# Patient Record
Sex: MALE | Race: WHITE | URBAN - METROPOLITAN AREA
[De-identification: names, ages, dates, MRNs, and addresses within clinical notes are randomized per-mention and may not be internally consistent; named-entity substitution may affect disease eponyms.]

---

## 2018-08-24 ENCOUNTER — TRANSFERRED RECORDS (OUTPATIENT)
Dept: HEALTH INFORMATION MANAGEMENT | Facility: CLINIC | Age: 48
End: 2018-08-24

## 2018-08-24 ENCOUNTER — MEDICAL CORRESPONDENCE (OUTPATIENT)
Dept: HEALTH INFORMATION MANAGEMENT | Facility: CLINIC | Age: 48
End: 2018-08-24

## 2018-12-12 NOTE — TELEPHONE ENCOUNTER
FUTURE VISIT INFORMATION      FUTURE VISIT INFORMATION:    Date: 12/24/18    Time: 10a    Location: [facility]  REFERRAL INFORMATION:    Referring provider:  Dr Celso Douglass    Referring providers clinic:  [facility]    Reason for visit/diagnosis  Myotoni    RECORDS REQUESTED FROM:       Clinic name Comments Records Status Imaging Status   [facility] Neurology Dr. Negrete 6/2/14 OV Internal                                12/12/18: External referral from Dr. Douglass at [facility]. Records and referral from [facility] scanned in [facility]. Patient previously seen by Dr. Negrete in 2014.

## 2018-12-24 ENCOUNTER — PRE VISIT (OUTPATIENT)
Dept: NEUROLOGY | Facility: CLINIC | Age: 48
End: 2018-12-24

## 2018-12-24 ENCOUNTER — OFFICE VISIT (OUTPATIENT)
Dept: NEUROLOGY | Facility: CLINIC | Age: 48
End: 2018-12-24
Payer: COMMERCIAL

## 2018-12-24 VITALS — SYSTOLIC BLOOD PRESSURE: 93 MMHG | OXYGEN SATURATION: 99 % | DIASTOLIC BLOOD PRESSURE: 65 MMHG | HEART RATE: 84 BPM

## 2018-12-24 DIAGNOSIS — G71.11 MYOTONIC MUSCULAR DYSTROPHY (H): Primary | ICD-10-CM

## 2018-12-24 PROBLEM — G47.00 INSOMNIA: Status: ACTIVE | Noted: 2018-12-24

## 2018-12-24 RX ORDER — LORATADINE 10 MG/1
10 TABLET ORAL
COMMUNITY
Start: 2018-12-10

## 2018-12-24 RX ORDER — IBUPROFEN 200 MG
200 TABLET ORAL
COMMUNITY
Start: 2018-07-09

## 2018-12-24 RX ORDER — ACETAMINOPHEN 325 MG/1
650 TABLET ORAL
COMMUNITY

## 2018-12-24 RX ORDER — OXYCODONE HYDROCHLORIDE 10 MG/1
5 TABLET ORAL
COMMUNITY

## 2018-12-24 RX ORDER — IPRATROPIUM BROMIDE AND ALBUTEROL SULFATE 2.5; .5 MG/3ML; MG/3ML
3 SOLUTION RESPIRATORY (INHALATION)
COMMUNITY
Start: 2018-12-10

## 2018-12-24 RX ORDER — ONDANSETRON 4 MG/1
4 TABLET, ORALLY DISINTEGRATING ORAL
COMMUNITY
Start: 2018-12-10

## 2018-12-24 RX ORDER — BISACODYL 10 MG
10 SUPPOSITORY, RECTAL RECTAL
COMMUNITY
Start: 2018-12-10

## 2018-12-24 NOTE — NURSING NOTE
Chief Complaint   Patient presents with     New Patient     [facility] NEW MUSCULAR DYSTROPHY     Pt. Does not know which medications he's taking, all liquid form administered by care staff.    Verena Torres, EMT

## 2018-12-24 NOTE — PROGRESS NOTES
Chief Complaint: Myotonic dystrophy type 1    History of Present Illness:    Eulalio Fierro is a 48 year old man with genetically confirmed DM1. He was last seen by Dr. Negrete in 2014. He lives with his family. His history is a bit difficult to put together, specifically the status of his current multidiscplinary care for his DM1. He is not sure if he has a cardiologist or a pulmonologist. I see there is a recent hospital admission note from [facility] for acute hypercapnic and hypoxemic respiratory failure. He was see by pulmonology in the hospital. Bipap was recommended by the patient refused. During that hospitalization he had a g tube placed for dysphagia. He was seen by cardiology. I also do not know what kind of follow up or continuity of care was arranged. He offers very little insight into his medical care.     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    Social History:    He denies tobacco, alcohol, or illicit drug use.    Medical Allergies:  NKDA     Current Medications:    Current Outpatient Medications   Medication     METOPROLOL TARTRATE PO     oxyCODONE-acetaminophen (PERCOCET) 7.5-325 MG per tablet     No current facility-administered medications for this visit.      Review of Systems: A complete review of systems was obtained and was negative except for what was noted above.    Physical examination:     BP 99/68 (BP Location: Right arm, Patient Position: Chair, Cuff Size: Adult Regular)   Pulse 79   SpO2 99%     General Appearance: NAD    Skin: There are no rashes or other skin lesions.    Musculoskeletal:  There is no scoliosis, lordosis, kyphosis, pes cavus, or hammertoes.    Neurologic examination:    Mental status:  Patient is alert, attentive,  and oriented x 3.  Language is coherent and fluent without aphasia.  Very poor historian.     Cranial nerves:  Bifacial weakness present. He is dysarthric. Pupils were round and reacted to light.  Extraocular movements were full.     Motor exam: Formal power testing is limited by effort. Proximal upper limb strength is probably 4/5, while distal is 3/5. Lower limb testing is also limited.     Complex motor skills revealed normal coordination.  Finger-nose- finger and heel to shin were intact.       Sensory exam revealed normal perception of vibration, proprioception, light touch, pin, and temperature proximally and distally in the arms and legs bilaterally. Romberg sign was absent.       Sensory exam: Able to detect pin and vibration in hands and feet    Gait: Not tested.     Deep tendon reflexes:   Right Left   Triceps 1 1   Biceps 1 1   Brachioradialis 1 1   Knee jerk 1 1   Ankle jerk 0 0      Assessment:  Eulalio Fierro is a 48 year old male with genetically confirmed myotonic dystrophy type 1. Myotonic dystrophy (DM) is a genetic disorder that affects CNS, cardiac, respiratory, gastrointestinal, endocrine and muscular systems. DM1 is an autosomal dominant disorder caused by a repeat expansion in the of the dystrophia myotonica-protein kinase (DMPK) gene on chromosome 19q13.3. Disease severity roughly correlates with the number of repeats, with greater numbers of repeats enerally having more severe disease. Pulmonary complications are the leading cause of death in DM1, followed by cardiac causes. His care appears very fractionated. I do not know who has been managing his pulmonary and cardiac conditions. Clearly his pulmonary issues are most pressing. I will try to help him get these coordinated. Will proceed as below.     Plan:  1. Respiratory: Will get him a pulmonology referral. He has refused NIV in the past.   2. Cardiac:  Referral to [facility] cardiology  3. Swallowing and speech: S/p PEG placement. Defer  management to his primary medical providers.   4. Muscular: No indication for Mexiletine or other medication for myotonia at this time  5. Endocrine: Hba1c and TSH   6. Anesthesia: The use of anesthesia raises special risks to DM patients, which include heightened sensitivity to sedatives and analgesics. Serious complications are most common in the post-anesthesia period when risk of aspiration and other complications are increased. Recommendations for anesthetic management can be found here: https://www.myotonic.org/sites/default/files/MDF%20Anesthesia%20Guidelines%20FNL%931646%202%202%20.pdf   Practical suggestions for the anesthetic management of a MD patient can be found here: https://www.myotonic.org/sites/default/files/MDF_LongForm_AnesGuidelines_01C.pdf  7. [facility] registration:  8. Follow up after above  -

## 2018-12-24 NOTE — LETTER
12/24/2018       RE: Eulalio Fierro  [address] [place] [facility] [address]     Dear Colleague,    Thank you for referring your patient, Eulalio Fierro, to the [facility] NEUROLOGY at [facility]. Please see a copy of my visit note below.    Chief Complaint: Myotonic dystrophy type 1    History of Present Illness:    Eulalio Fierro is a 48 year old man with genetically confirmed DM1. He was last seen by Dr. Negrete in 2014. He lives with his family. His history is a bit difficult to put together, specifically the status of his current multidiscplinary care for his DM1. He is not sure if he has a cardiologist or a pulmonologist. I see there is a recent hospital admission note from [facility] for acute hypercapnic and hypoxemic respiratory failure. He was see by pulmonology in the hospital. Bipap was recommended by the patient refused. During that hospitalization he had a g tube placed for dysphagia. He was seen by cardiology. I also do not know what kind of follow up or continuity of care was arranged. He offers very little insight into his medical care.     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    Social History:    He denies tobacco, alcohol, or illicit drug use.    Medical Allergies:  NKDA     Current Medications:    Current Outpatient Medications   Medication     METOPROLOL TARTRATE PO     oxyCODONE-acetaminophen (PERCOCET) 7.5-325 MG per tablet     No current facility-administered medications for this visit.      Review of Systems: A complete review of systems was obtained and was negative except for what was noted above.    Physical examination:     BP 99/68 (BP Location: Right arm, Patient Position: Chair, Cuff Size: Adult Regular)    Pulse 79   SpO2 99%     General Appearance: NAD    Skin: There are no rashes or other skin lesions.    Musculoskeletal:  There is no scoliosis, lordosis, kyphosis, pes cavus, or hammertoes.    Neurologic examination:    Mental status:  Patient is alert, attentive, and oriented x 3.  Language is coherent and fluent without aphasia.  Very poor historian.     Cranial nerves:  Bifacial weakness present. He is dysarthric. Pupils were round and reacted to light.  Extraocular movements were full.     Motor exam: Formal power testing is limited by effort. Proximal upper limb strength is probably 4/5, while distal is 3/5. Lower limb testing is also limited.     Complex motor skills revealed normal coordination.  Finger-nose- finger and heel to shin were intact.       Sensory exam revealed normal perception of vibration, proprioception, light touch, pin, and temperature proximally and distally in the arms and legs bilaterally. Romberg sign was absent.       Sensory exam: Able to detect pin and vibration in hands and feet    Gait: Not tested.     Deep tendon reflexes:   Right Left   Triceps 1 1   Biceps 1 1   Brachioradialis 1 1   Knee jerk 1 1   Ankle jerk 0 0      Assessment:  Eulalio Fierro is a 48 year old male with genetically confirmed myotonic dystrophy type 1. Myotonic dystrophy (DM) is a genetic disorder that affects CNS, cardiac, respiratory, gastrointestinal, endocrine and muscular systems. DM1 is an autosomal dominant disorder caused by a repeat expansion in the of the dystrophia myotonica-protein kinase (DMPK) gene on chromosome 19q13.3. Disease severity roughly correlates with the number of repeats, with greater numbers of repeats enerally having more severe disease. Pulmonary complications are the leading cause of death in DM1, followed by cardiac causes. His care appears very fractionated. I do not know who has been managing his pulmonary and cardiac conditions. Clearly his pulmonary issues are most  pressing. I will try to help him get these coordinated. Will proceed as below.     Plan:  1. Respiratory: Will get him a pulmonology referral. He has refused NIV in the past.   2. Cardiac:  Referral to [facility] cardiology  3. Swallowing and speech: S/p PEG placement. Defer management to his primary medical providers.   4. Muscular: No indication for Mexiletine or other medication for myotonia at this time  5. Endocrine: Hba1c and TSH   6. Anesthesia: The use of anesthesia raises special risks to DM patients, which include heightened sensitivity to sedatives and analgesics. Serious complications are most common in the post-anesthesia period when risk of aspiration and other complications are increased. Recommendations for anesthetic management can be found here: https://www.myotonic.org/sites/default/files/MDF%20Anesthesia%20Guidelines%20FNL%509984%202%202%20.pdf   Practical suggestions for the anesthetic management of a MD patient can be found here: https://www.myotonic.org/sites/default/files/MDF_LongForm_AnesGuidelines_01C.pdf  7. [facility] registration:  8. Follow up after above  -      Jorge Pelaez MD

## 2019-01-16 DIAGNOSIS — G71.11 MYOTONIC MUSCULAR DYSTROPHY (H): ICD-10-CM

## 2019-01-16 DIAGNOSIS — Z13.6 SCREENING FOR CARDIOVASCULAR CONDITION: Primary | ICD-10-CM

## 2019-01-16 DIAGNOSIS — I48.0 PAROXYSMAL ATRIAL FIBRILLATION (H): Primary | ICD-10-CM

## 2019-01-18 DIAGNOSIS — G71.11 MYOTONIC MUSCULAR DYSTROPHY (H): Primary | ICD-10-CM

## 2019-01-21 ENCOUNTER — OFFICE VISIT (OUTPATIENT)
Dept: PULMONOLOGY | Facility: CLINIC | Age: 49
End: 2019-01-21
Payer: COMMERCIAL

## 2019-01-21 ENCOUNTER — ANCILLARY PROCEDURE (OUTPATIENT)
Dept: CARDIOLOGY | Facility: CLINIC | Age: 49
End: 2019-01-21
Attending: INTERNAL MEDICINE
Payer: COMMERCIAL

## 2019-01-21 VITALS
HEART RATE: 103 BPM | SYSTOLIC BLOOD PRESSURE: 103 MMHG | HEIGHT: 70 IN | OXYGEN SATURATION: 94 % | BODY MASS INDEX: 28.69 KG/M2 | WEIGHT: 200.4 LBS | DIASTOLIC BLOOD PRESSURE: 66 MMHG

## 2019-01-21 VITALS
SYSTOLIC BLOOD PRESSURE: 103 MMHG | BODY MASS INDEX: 28.69 KG/M2 | DIASTOLIC BLOOD PRESSURE: 66 MMHG | OXYGEN SATURATION: 94 % | WEIGHT: 200.4 LBS | HEART RATE: 103 BPM | RESPIRATION RATE: 16 BRPM | HEIGHT: 70 IN

## 2019-01-21 DIAGNOSIS — G71.11 MYOTONIC MUSCULAR DYSTROPHY (H): Primary | ICD-10-CM

## 2019-01-21 DIAGNOSIS — I48.0 PAROXYSMAL ATRIAL FIBRILLATION (H): ICD-10-CM

## 2019-01-21 DIAGNOSIS — J98.4 RESTRICTIVE LUNG DISEASE: ICD-10-CM

## 2019-01-21 DIAGNOSIS — Z13.6 ENCOUNTER FOR SCREENING FOR CARDIOVASCULAR DISORDERS: Primary | ICD-10-CM

## 2019-01-21 DIAGNOSIS — G71.11 MYOTONIC DYSTROPHY (H): Primary | ICD-10-CM

## 2019-01-21 DIAGNOSIS — G71.11 MYOTONIC MUSCULAR DYSTROPHY (H): ICD-10-CM

## 2019-01-21 DIAGNOSIS — R06.02 SOB (SHORTNESS OF BREATH): ICD-10-CM

## 2019-01-21 PROBLEM — R13.12 OROPHARYNGEAL DYSPHAGIA: Status: ACTIVE | Noted: 2018-12-05

## 2019-01-21 PROBLEM — J02.9 ACUTE PHARYNGITIS: Status: ACTIVE | Noted: 2018-11-17

## 2019-01-21 PROBLEM — R91.8 PULMONARY NODULES: Status: ACTIVE | Noted: 2018-07-03

## 2019-01-21 PROBLEM — I30.9 ACUTE PERICARDITIS: Status: ACTIVE | Noted: 2018-12-05

## 2019-01-21 PROBLEM — R53.81 PHYSICAL DECONDITIONING: Status: ACTIVE | Noted: 2018-12-10

## 2019-01-21 PROBLEM — J96.01 ACUTE RESPIRATORY FAILURE WITH HYPOXIA AND HYPERCAPNIA (H): Status: ACTIVE | Noted: 2018-11-17

## 2019-01-21 PROBLEM — J96.02 ACUTE RESPIRATORY FAILURE WITH HYPOXIA AND HYPERCAPNIA (H): Status: ACTIVE | Noted: 2018-11-17

## 2019-01-21 LAB
CK SERPL-CCNC: 50 U/L (ref 30–300)
NT-PROBNP SERPL-MCNC: 32 PG/ML (ref 0–125)
TROPONIN I SERPL-MCNC: <0.015 UG/L (ref 0–0.04)

## 2019-01-21 PROCEDURE — 0298T ZZC EXT ECG > 48HR TO 21 DAY REVIEW AND INTERPRETATN: CPT | Mod: ZP | Performed by: INTERNAL MEDICINE

## 2019-01-21 PROCEDURE — 99205 OFFICE O/P NEW HI 60 MIN: CPT | Mod: ZP | Performed by: INTERNAL MEDICINE

## 2019-01-21 PROCEDURE — 82550 ASSAY OF CK (CPK): CPT

## 2019-01-21 PROCEDURE — G0463 HOSPITAL OUTPT CLINIC VISIT: HCPCS | Mod: 25,ZF

## 2019-01-21 PROCEDURE — 83880 ASSAY OF NATRIURETIC PEPTIDE: CPT

## 2019-01-21 PROCEDURE — 93010 ELECTROCARDIOGRAM REPORT: CPT | Mod: ZP | Performed by: INTERNAL MEDICINE

## 2019-01-21 PROCEDURE — 0296T ZIO PATCH HOLTER ADULT PEDIATRIC GREATER THAN 48 HRS: CPT | Mod: ZF

## 2019-01-21 PROCEDURE — 93005 ELECTROCARDIOGRAM TRACING: CPT | Mod: ZF

## 2019-01-21 PROCEDURE — 84484 ASSAY OF TROPONIN QUANT: CPT

## 2019-01-21 RX ORDER — LANOLIN ALCOHOL/MO/W.PET/CERES
3 CREAM (GRAM) TOPICAL
COMMUNITY
Start: 2019-01-07

## 2019-01-21 RX ORDER — PROBIOTIC PRODUCT - TAB 1B-250 MG
2 TAB ORAL
COMMUNITY
Start: 2019-01-07

## 2019-01-21 RX ORDER — QUETIAPINE FUMARATE 25 MG/1
25 TABLET, FILM COATED ORAL
COMMUNITY
Start: 2019-01-07

## 2019-01-21 RX ORDER — TRAZODONE HYDROCHLORIDE 100 MG/1
100 TABLET ORAL
COMMUNITY
Start: 2019-01-07

## 2019-01-21 SDOH — HEALTH STABILITY: MENTAL HEALTH: HOW OFTEN DO YOU HAVE A DRINK CONTAINING ALCOHOL?: NEVER

## 2019-01-21 ASSESSMENT — MIFFLIN-ST. JEOR
SCORE: 1785.25
SCORE: 1785.26

## 2019-01-21 ASSESSMENT — PAIN SCALES - GENERAL
PAINLEVEL: NO PAIN (0)
PAINLEVEL: NO PAIN (0)

## 2019-01-21 NOTE — PROGRESS NOTES
Neurocardiomyopathy Clinic Consultation    Name: Eulalio Fierro  : 1970  MRN: [id number]    2019    Dear Yasmin Pelaez and Penelope,    I had the pleasure of seeing Eulalio Fierro, a 48 year old man today 2019 in the [facility] Failure [facility] for cardiovascular evaluation in the setting of myotonic dystrophy. He is here with an assistant from his nursing home.     As you know, he has genetically confirmed myotonic dystrophy type 1 with 796 CTG repeats of the DMPK gene and .     He was recently hospitalized -12/10 at [facility] for hypercapneic and hypoxic respiratory failure. He was seen by pulmonology during this hospitalization and BiPAP was recommended but the patient refused. A PEG tube was placed during this admission for dysphagia and he now only gets nutrition via G tube. He had an episode of chest pain and was seen by cardiology. An ECG showed ST elevation in all leads, but troponins were negative. He was initially treated for pericarditis and subsequently the ECG changes were attributed to early repolarization. They also suspected that his chest pain may be related to GERD. He had an echocardiogram during this hospitalization  which demonstrated LVEF of 64%, normal RV size and function, and no significant valvular disease.     He is not able to provide a significant amount of information and much of it is from his assistant. He is using a wheelchair when he is out and otherwise a walker to walk short distances in his room. He uses 4L of oxygen and does have some dyspnea with walking short distances. He is not having any further chest pain since using the PEG. He denies palpitations. He does have edema in his legs at the end of the day. No orthopnea or PND.  He has had some dizziness when standing but no pre/syncope.      ROS: 10 point ROS neg other than the symptoms noted above in the HPI.    PAST MEDICAL HISTORY:  1.      "Myononic dystrophye type 1 796 CTG repeats on the DMPK gene  2. Restrictive lung disease   On home oyxgen 4L/min  3. S/p cholecystectomy 2014 4 Atrial fibrillation, occurred after cholecystectomy     ALLERGIES: NKDA    MEDICATIONS:   Current Outpatient Medications on File Prior to Visit:  acetaminophen (TYLENOL) 325 MG tablet Take 650 mg by mouth   bisacodyl (DULCOLAX) 10 MG suppository Place 10 mg rectally   enoxaparin (LOVENOX) 40 MG/0.4ML syringe Inject 40 mg Subcutaneous   ipratropium - albuterol 0.5 mg/2.5 mg/3 mL (DUONEB) 0.5-2.5 (3) MG/3ML neb solution Inhale 3 mLs into the lungs   LANsoprazole (PREVACID SOLUTAB) 30 MG ODT 30 mg by Per G Tube route   loratadine (CLARITIN) 10 MG tablet 10 mg by Per G Tube route   melatonin 3 MG tablet Take 3 mg by mouth   ondansetron (ZOFRAN-ODT) 4 MG ODT tab 4 mg   oxyCODONE IR (ROXICODONE) 10 MG tablet 5 mg   oxyCODONE-acetaminophen (PERCOCET) 7.5-325 MG per tablet Take 1 tablet by mouth every 4 hours as needed for moderate to severe pain   Probiotic Product (CHELLE-BID PROBIOTIC) TABS Take 2 tablets by mouth   QUEtiapine (SEROQUEL) 25 MG tablet Take 25 mg by mouth   traZODone (DESYREL) 100 MG tablet 100 mg by Per G Tube route   ibuprofen (ADVIL/MOTRIN) 200 MG tablet Take 200 mg by mouth   METOPROLOL TARTRATE PO Take 12.5 mg by mouth 2 times daily     No current facility-administered medications on file prior to visit.     SOCIAL HISTORY: Lives in [facility]. No tobacco, alcohol, or illicit substances    FAMILY HISTORY: His mother may have myotonic dystrophy. He has one sister who is not affected. No family history of SCD or cardiomyopathy.     PHYSICAL EXAM:   /66 (BP Location: Left arm, Patient Position: Chair, Cuff Size: Adult Regular)   Pulse 103   Ht 1.778 m (5' 10\")   Wt 90.9 kg (200 lb 6.4 oz)   SpO2 94%   BMI 28.75 kg/m    General: sitting in wheelchair, myotonic facies  Neck: Estimate JVP <7, normal carotid upstroke  CV: RRR, nl s1 and s2, no " murmurs  Lungs: CTAB, no crackles or wheezes, wearing oxygen, normal work of breathing  Abdomen: BS+, soft, non tender, non distended  Extremities: warm and well perfused, trace edema  Neuro: significant weakness in upper and lower extremities, in a wheel chair   Psych: normal affect  Skin: dry skin, no lesions    DIAGNOSTIC TESTING:   ECG, personally reviewed, junctional rhythm with rate of 92 bpm, there is diffuse ST elevation that appears more of a repolarization pattern       ASSESSMENT AND PLAN: 48 year-old man with myotonic dystrophy type 1 with 796 CTG repeats     He has some cardiovascular symptoms including lower extremity edema though otherwise appears well compensated. He has had a recent echocardiogram with normal function. We discussed doing a cardiac MRI, however he has significant claustrophobia. We can repeat an echocardiogram in 1 year. He does have some ECG changes consistent with repolarization and as above we will get a troponin to exclude ACS, though he is not having any active chest pain. I would like him to have a ziopatch monitor given increased arrhythmias in patients with neuromuscular disorders. He had a recent CMP and CBC which were essentially normal, I will have him get a troponin, CK, and NT-pro BNP today for further cardiac evaluation.     I will plan to see him back in 1 year or sooner should issues arise.     Thank you for allowing me to participate in the care of your patient. Please do not hesitate to contact me if you have any questions.     Sincerely,   Forum     Forum MD [facility], formerly [facility]  Advanced Heart Failure/Transplantation/MCS  [facility]/[employer]  [phone]    CC: Jeramy Pelaez MD (neurology)   Anil Negrete MD (neurology)     ADDENDUM: Troponin and BNP were normal.

## 2019-01-21 NOTE — LETTER
2019      RE: Eulalio Fierro  [address] [place] [address]       Dear Colleague,    Thank you for the opportunity to participate in the care of your patient, Eulalio Fierro, at the [facility] HEART [facility]. Please see a copy of my visit note below.    Neurocardiomyopathy Clinic Consultation    Name: Eulalio Fierro  : 1970  MRN: [id number]    2019    Dear Yasmin Pelaez and Penelope,    I had the pleasure of seeing Eulalio Fierro, a 48 year old man today 2019 in the [facility] Failure [facility] for cardiovascular evaluation in the setting of myotonic dystrophy. He is here with an assistant from his nursing home.     As you know, he has genetically confirmed myotonic dystrophy type 1 with 796 CTG repeats of the DMPK gene and .     He was recently hospitalized -12/10 at [facility] for hypercapneic and hypoxic respiratory failure. He was seen by pulmonology during this hospitalization and BiPAP was recommended but the patient refused. A PEG tube was placed during this admission for dysphagia and he now only gets nutrition via G tube. He had an episode of chest pain and was seen by cardiology. An ECG showed ST elevation in all leads, but troponins were negative. He was initially treated for pericarditis and subsequently the ECG changes were attributed to early repolarization. They also suspected that his chest pain may be related to GERD. He had an echocardiogram during this hospitalization  which demonstrated LVEF of 64%, normal RV size and function, and no significant valvular disease.     He is not able to provide a significant amount of information and much of it is from his assistant. He is using a wheelchair when he is out and otherwise a walker to walk short distances in his room. He uses 4L of oxygen and does have some dyspnea with walking short distances. He is not having  any further chest pain since using the PEG. He denies palpitations. He does have edema in his legs at the end of the day. No orthopnea or PND.  He has had some dizziness when standing but no pre/syncope.      ROS: 10 point ROS neg other than the symptoms noted above in the HPI.    PAST MEDICAL HISTORY:  1.     Myononic dystrophye type 1 796 CTG repeats on the DMPK gene  2. Restrictive lung disease   On home oyxgen 4L/min  3. S/p cholecystectomy 2014 4 Atrial fibrillation, occurred after cholecystectomy     ALLERGIES: NKDA    MEDICATIONS:   Current Outpatient Medications on File Prior to Visit:  acetaminophen (TYLENOL) 325 MG tablet Take 650 mg by mouth   bisacodyl (DULCOLAX) 10 MG suppository Place 10 mg rectally   enoxaparin (LOVENOX) 40 MG/0.4ML syringe Inject 40 mg Subcutaneous   ipratropium - albuterol 0.5 mg/2.5 mg/3 mL (DUONEB) 0.5-2.5 (3) MG/3ML neb solution Inhale 3 mLs into the lungs   LANsoprazole (PREVACID SOLUTAB) 30 MG ODT 30 mg by Per G Tube route   loratadine (CLARITIN) 10 MG tablet 10 mg by Per G Tube route   melatonin 3 MG tablet Take 3 mg by mouth   ondansetron (ZOFRAN-ODT) 4 MG ODT tab 4 mg   oxyCODONE IR (ROXICODONE) 10 MG tablet 5 mg   oxyCODONE-acetaminophen (PERCOCET) 7.5-325 MG per tablet Take 1 tablet by mouth every 4 hours as needed for moderate to severe pain   Probiotic Product (CHELLE-BID PROBIOTIC) TABS Take 2 tablets by mouth   QUEtiapine (SEROQUEL) 25 MG tablet Take 25 mg by mouth   traZODone (DESYREL) 100 MG tablet 100 mg by Per G Tube route   ibuprofen (ADVIL/MOTRIN) 200 MG tablet Take 200 mg by mouth   METOPROLOL TARTRATE PO Take 12.5 mg by mouth 2 times daily     No current facility-administered medications on file prior to visit.     SOCIAL HISTORY: Lives in [facility]. No tobacco, alcohol, or illicit substances    FAMILY HISTORY: His mother may have myotonic dystrophy. He has one sister who is not affected. No family history of SCD or cardiomyopathy.     PHYSICAL  "EXAM:   /66 (BP Location: Left arm, Patient Position: Chair, Cuff Size: Adult Regular)   Pulse 103   Ht 1.778 m (5' 10\")   Wt 90.9 kg (200 lb 6.4 oz)   SpO2 94%   BMI 28.75 kg/m     General: sitting in wheelchair, myotonic facies  Neck: Estimate JVP <7, normal carotid upstroke  CV: RRR, nl s1 and s2, no murmurs  Lungs: CTAB, no crackles or wheezes, wearing oxygen, normal work of breathing  Abdomen: BS+, soft, non tender, non distended  Extremities: warm and well perfused, trace edema  Neuro: significant weakness in upper and lower extremities, in a wheel chair   Psych: normal affect  Skin: dry skin, no lesions    DIAGNOSTIC TESTING:   ECG, personally reviewed, junctional rhythm with rate of 92 bpm, there is diffuse ST elevation that appears more of a repolarization pattern       ASSESSMENT AND PLAN: 48 year-old man with myotonic dystrophy type 1 with 796 CTG repeats     He has some cardiovascular symptoms including lower extremity edema though otherwise appears well compensated. He has had a recent echocardiogram with normal function. We discussed doing a cardiac MRI, however he has significant claustrophobia. We can repeat an echocardiogram in 1 year. He does have some ECG changes consistent with repolarization and as above we will get a troponin to exclude ACS, though he is not having any active chest pain. I would like him to have a ziopatch monitor given increased arrhythmias in patients with neuromuscular disorders. He had a recent CMP and CBC which were essentially normal, I will have him get a troponin, CK, and NT-pro BNP today for further cardiac evaluation.     I will plan to see him back in 1 year or sooner should issues arise.     Thank you for allowing me to participate in the care of your patient. Please do not hesitate to contact me if you have any questions.     Sincerely,   Forum     Forum MD Syed, FACC  Advanced Heart Failure/Transplantation/MCS  [facility]" [place]/[employer]  [phone]    CC: Jeramy Pelaez MD (neurology)   Anil Negrete MD (neurology)     ADDENDUM: Troponin and BNP were normal.

## 2019-01-21 NOTE — NURSING NOTE
Chief Complaint   Patient presents with     New Patient     NMD     Medication Reconciliation     COMPLETE     Heather Kelley

## 2019-01-21 NOTE — LETTER
"1/21/2019       RE: Eulalio Fierro  [address] [place] [id number]     Dear Colleague,    Thank you for referring your patient, Eulalio Fierro, to the [facility] NEUROLOGY at [facility]. Please see a copy of my visit note below.    Pulmonary outpatient consultation    January 21, 2019    S: Eulalio Fierro is being seen today at the request of Dr. 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 History is obtained largely from chart review. He can provide some history, but it's sketchy. He's here with a worker from his SNF, but she doesn't seem to know him very well.    According to Care Everywhere, he was admitted to [facility] in November of last year. ABG shows respiratory acidosis, with pCO2 of 100 and pH 7.21. He was treated with BiPAP for a short period of time, but once his acute illness resolved (thought perhaps to have bronchitis or pneumonia in addition to his chronic problems) he refused PAP.    Today, her remembers BiPAP and tells me that he will never use it. I told him that I was worried he'd die without some type of ventilatory support and he said - \"don't make me make that choice\". He was discharged to [facility] and then [facility] - previously, it sounds like he and his mother lived together in his sister's basement. It's not clear whether he'd ever be able to return to that living situation.      Current Outpatient Medications   Medication Sig Dispense Refill     acetaminophen (TYLENOL) 325 MG tablet Take 650 mg by mouth       bisacodyl (DULCOLAX) 10 MG suppository Place 10 mg rectally       enoxaparin (LOVENOX) 40 MG/0.4ML syringe Inject 40 mg Subcutaneous       ibuprofen (ADVIL/MOTRIN) 200 MG tablet Take 200 mg by mouth       ipratropium - albuterol 0.5 mg/2.5 mg/3 mL (DUONEB) 0.5-2.5 (3) MG/3ML neb solution Inhale 3 mLs into the lungs       LANsoprazole (PREVACID SOLUTAB) 30 MG ODT 30 mg by Per G Tube route       " "loratadine (CLARITIN) 10 MG tablet 10 mg by Per G Tube route       melatonin 3 MG tablet Take 3 mg by mouth       METOPROLOL TARTRATE PO Take 12.5 mg by mouth 2 times daily       ondansetron (ZOFRAN-ODT) 4 MG ODT tab 4 mg       oxyCODONE IR (ROXICODONE) 10 MG tablet 5 mg       oxyCODONE-acetaminophen (PERCOCET) 7.5-325 MG per tablet Take 1 tablet by mouth every 4 hours as needed for moderate to severe pain       Probiotic Product (CHELLE-BID PROBIOTIC) TABS Take 2 tablets by mouth       QUEtiapine (SEROQUEL) 25 MG tablet Take 25 mg by mouth       traZODone (DESYREL) 100 MG tablet 100 mg by Per G Tube route         Social Hx:  Not currently exposed to any secondhand cigarette smoke    Family Hx: Per chart, negative for cancer or diabetes    O:  Restless and intermittently agitated. Some use of resp acc muscles with movement  VS: /66   Pulse 103   Resp 16   Ht 1.778 m (5' 10\")   Wt 90.9 kg (200 lb 6.4 oz)   SpO2 94%   BMI 28.75 kg/m     HEENT: Facies typical of Myotonic Dystrophy. Marginal dentition  Neck: No SUMEET  Lungs: Diminished air entry bilaterally with bibasilar crackles. No wheezing  Cor: RRR S1S2, no murmurs  Extr: Clubbing present. No cyanosis. 1+ pretibial edema bilaterally  Neuro: Alert, speech a bit pressured and dysarthric. Able to walk with assistance    Spirometry is performed today and is personally reviewed: FEV1 is 1.46 L or 39% predicted. FVC is 1.68 L or 36 % predicted.     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 Unfortunately, he has been poorly tolerant of NIV, and I don't think he'd tolerate BiPAP if we tried it again.    He's not able to care for himself at home, so he's in SNF. His family is mother and sister - he used to live with them in [place], [place].    I also think trach would be poorly tolerated. It would be nice to discuss goals of care with his " family - I don't think Eulalio would be able to participate much, however.    I didn't make changes to his treatment plan, but I'll see him back in 2 months and talk again about whether to call/involve his mother and sister in his care (unless they come to clinic with him)    Renay Ren MD    Dept of Pulmonary, Sleep and Critical Care Medicine  Pager [phone]

## 2019-01-21 NOTE — PATIENT INSTRUCTIONS
Wear ZIO monitor for 2 days and mail back to company.  Have labs drawn today  Follow up in one yr with Dr. Lynch.    Merna Lott, RN  Cardiology Care Coordinator  Please be aware that I work part-time but I will be checking messages several times per week.   For urgent needs, please call the number below.    [phone], press 1 for 2d2c, press 3 to speak to a nurse    .

## 2019-01-21 NOTE — PROGRESS NOTES
"No tobacco, alcohol, or illicits     Using wheelchair when out   Walker in the room - does have dyspnea with exertion walking from bed to bathroom    Using 4L PM oxygen   No chest pain  No palpitations   Getting edema in legs, less now  No ortopnea or PND  G tube feed  Low energy levels  Occasional dizziness, no syncope     Eulalio Fierro is a 48 year old male who presents to the clinic today for a recheck of congestive heart failure.    Mother may have myotonic dystrophy, one sister one       Hospitalized at [facility] for breathing problems       Vitals: /66 (BP Location: Left arm, Patient Position: Chair, Cuff Size: Adult Regular)   Pulse 103   Ht 1.778 m (5' 10\")   Wt 90.9 kg (200 lb 6.4 oz)   SpO2 94%   BMI 28.75 kg/m    BMI= Body mass index is 28.75 kg/m .    "

## 2019-01-21 NOTE — NURSING NOTE
Chief Complaint   Patient presents with     New Patient     Dr. Lynch/Benigno     Vitals were taken and medications were reconciled. EKG was performed    Phoebe ALLEN  1:01 PM

## 2019-01-21 NOTE — PROGRESS NOTES
"Pulmonary outpatient consultation    January 21, 2019    S: Eulalio Fierro is being seen today at the request of Dr. 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 History is obtained largely from chart review. He can provide some history, but it's sketchy. He's here with a worker from his SNF, but she doesn't seem to know him very well.    According to Care Everywhere, he was admitted to [facility] in November of last year. ABG shows respiratory acidosis, with pCO2 of 100 and pH 7.21. He was treated with BiPAP for a short period of time, but once his acute illness resolved (thought perhaps to have bronchitis or pneumonia in addition to his chronic problems) he refused PAP.    Today, her remembers BiPAP and tells me that he will never use it. I told him that I was worried he'd die without some type of ventilatory support and he said - \"don't make me make that choice\". He was discharged to [facility] and then [facility] - previously, it sounds like he and his mother lived together in his sister's basement. It's not clear whether he'd ever be able to return to that living situation.    He is unable to provide complete hx or ROS due to CNS dysfunction from his myotonic dystrophy, and the worker with him does not seem to know him well      Current Outpatient Medications   Medication Sig Dispense Refill     acetaminophen (TYLENOL) 325 MG tablet Take 650 mg by mouth       bisacodyl (DULCOLAX) 10 MG suppository Place 10 mg rectally       enoxaparin (LOVENOX) 40 MG/0.4ML syringe Inject 40 mg Subcutaneous       ibuprofen (ADVIL/MOTRIN) 200 MG tablet Take 200 mg by mouth       ipratropium - albuterol 0.5 mg/2.5 mg/3 mL (DUONEB) 0.5-2.5 (3) MG/3ML neb solution Inhale 3 mLs into the lungs       LANsoprazole (PREVACID SOLUTAB) 30 MG ODT 30 mg by Per G Tube route       loratadine (CLARITIN) 10 MG tablet 10 mg by Per G Tube route       melatonin 3 MG tablet Take 3 mg by mouth       " "METOPROLOL TARTRATE PO Take 12.5 mg by mouth 2 times daily       ondansetron (ZOFRAN-ODT) 4 MG ODT tab 4 mg       oxyCODONE IR (ROXICODONE) 10 MG tablet 5 mg       oxyCODONE-acetaminophen (PERCOCET) 7.5-325 MG per tablet Take 1 tablet by mouth every 4 hours as needed for moderate to severe pain       Probiotic Product (CHELLE-BID PROBIOTIC) TABS Take 2 tablets by mouth       QUEtiapine (SEROQUEL) 25 MG tablet Take 25 mg by mouth       traZODone (DESYREL) 100 MG tablet 100 mg by Per G Tube route         Social Hx:  Not currently exposed to any secondhand cigarette smoke    Family Hx: Per chart, negative for cancer or diabetes    O:  Restless and intermittently agitated. Some use of resp acc muscles with movement  VS: /66   Pulse 103   Resp 16   Ht 1.778 m (5' 10\")   Wt 90.9 kg (200 lb 6.4 oz)   SpO2 94%   BMI 28.75 kg/m    HEENT: Facies typical of Myotonic Dystrophy. Marginal dentition  Neck: No SUMEET  Lungs: Diminished air entry bilaterally with bibasilar crackles. No wheezing  Cor: RRR S1S2, no murmurs  Extr: Clubbing present. No cyanosis. 1+ pretibial edema bilaterally  Neuro: Alert, speech a bit pressured and dysarthric. Able to walk with assistance    Spirometry is performed today and is personally reviewed: FEV1 is 1.46 L or 39% predicted. FVC is 1.68 L or 36 % predicted.     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 Unfortunately, he has been poorly tolerant of NIV, and I don't think he'd tolerate BiPAP if we tried it again.    He's not able to care for himself at home, so he's in SNF. His family is mother and sister - he used to live with them in [place], [place].    I also think trach would be poorly tolerated. It would be nice to discuss goals of care with his family - I don't think Eulalio would be able to participate much, however.    I didn't make changes to his treatment " plan, but I'll see him back in 2 months and talk again about whether to call/involve his mother and sister in his care (unless they come to clinic with him)      Renay Ren MD    Dept of Pulmonary, Sleep and Critical Care Medicine  Pager [phone]

## 2019-01-21 NOTE — PROGRESS NOTES
Per Dr. Lynch, patient to have Z[employer] 3 days monitor placed.  Diagnosis: Paro A Fib   Monitor placed: Yes  Patient Instructed: Yes  Patient verbalized understanding: Yes  Holter # [id number]    Placed By Phoebe ALLEN

## 2019-01-22 LAB — INTERPRETATION ECG - MUSE: NORMAL

## 2019-01-23 LAB
EXPTIME-PRE: 6.04 SEC
FEF2575-%PRED-PRE: 60 %
FEF2575-PRE: 2.11 L/SEC
FEF2575-PRED: 3.48 L/SEC
FEFMAX-%PRED-PRE: 36 %
FEFMAX-PRE: 3.65 L/SEC
FEFMAX-PRED: 9.9 L/SEC
FEV1-%PRED-PRE: 39 %
FEV1-PRE: 1.46 L
FEV1FEV6-PRE: 87 %
FEV1FEV6-PRED: 81 %
FEV1FVC-PRE: 87 %
FEV1FVC-PRED: 78 %
FIFMAX-PRE: 2.02 L/SEC
FVC-%PRED-PRE: 36 %
FVC-PRE: 1.68 L
FVC-PRED: 4.64 L
MEP-PRE: 30 CMH2O
MIP-PRE: -25 CMH2O

## 2019-11-14 ENCOUNTER — RECORDS - HEALTHEAST (OUTPATIENT)
Dept: LAB | Facility: CLINIC | Age: 49
End: 2019-11-14

## 2019-11-15 LAB
ANION GAP SERPL CALCULATED.3IONS-SCNC: 6 MMOL/L (ref 5–18)
BUN SERPL-MCNC: 16 MG/DL (ref 8–22)
CALCIUM SERPL-MCNC: 9.6 MG/DL (ref 8.5–10.5)
CHLORIDE BLD-SCNC: 101 MMOL/L (ref 98–107)
CO2 SERPL-SCNC: 37 MMOL/L (ref 22–31)
CREAT SERPL-MCNC: 0.57 MG/DL (ref 0.6–1.1)
GFR SERPL CREATININE-BSD FRML MDRD: >60 ML/MIN/1.73M2
GLUCOSE BLD-MCNC: 75 MG/DL (ref 70–125)
POTASSIUM BLD-SCNC: 4.4 MMOL/L (ref 3.5–5)
SODIUM SERPL-SCNC: 144 MMOL/L (ref 136–145)

## 2020-01-07 ENCOUNTER — RECORDS - HEALTHEAST (OUTPATIENT)
Dept: LAB | Facility: CLINIC | Age: 50
End: 2020-01-07

## 2020-01-08 LAB
ALBUMIN SERPL-MCNC: 3.7 G/DL (ref 3.5–5)
ALP SERPL-CCNC: 85 U/L (ref 45–120)
ALT SERPL W P-5'-P-CCNC: 29 U/L (ref 0–45)
ANION GAP SERPL CALCULATED.3IONS-SCNC: 5 MMOL/L (ref 5–18)
AST SERPL W P-5'-P-CCNC: 34 U/L (ref 0–40)
BILIRUB SERPL-MCNC: 0.3 MG/DL (ref 0–1)
BUN SERPL-MCNC: 14 MG/DL (ref 8–22)
CALCIUM SERPL-MCNC: 9.7 MG/DL (ref 8.5–10.5)
CHLORIDE BLD-SCNC: 103 MMOL/L (ref 98–107)
CO2 SERPL-SCNC: 37 MMOL/L (ref 22–31)
CREAT SERPL-MCNC: 0.57 MG/DL (ref 0.7–1.3)
ERYTHROCYTE [DISTWIDTH] IN BLOOD BY AUTOMATED COUNT: 13.1 % (ref 11–14.5)
GFR SERPL CREATININE-BSD FRML MDRD: >60 ML/MIN/1.73M2
GLUCOSE BLD-MCNC: 79 MG/DL (ref 70–125)
HCT VFR BLD AUTO: 42.6 % (ref 40–54)
HGB BLD-MCNC: 12.5 G/DL (ref 14–18)
MCH RBC QN AUTO: 29.1 PG (ref 27–34)
MCHC RBC AUTO-ENTMCNC: 29.3 G/DL (ref 32–36)
MCV RBC AUTO: 99 FL (ref 80–100)
PLATELET # BLD AUTO: 167 THOU/UL (ref 140–440)
PMV BLD AUTO: 13.2 FL (ref 8.5–12.5)
POTASSIUM BLD-SCNC: 4.8 MMOL/L (ref 3.5–5)
PROT SERPL-MCNC: 6.9 G/DL (ref 6–8)
RBC # BLD AUTO: 4.3 MILL/UL (ref 4.4–6.2)
SODIUM SERPL-SCNC: 145 MMOL/L (ref 136–145)
WBC: 7.2 THOU/UL (ref 4–11)

## 2020-01-14 ENCOUNTER — RECORDS - HEALTHEAST (OUTPATIENT)
Dept: LAB | Facility: CLINIC | Age: 50
End: 2020-01-14

## 2020-01-15 LAB
ANION GAP SERPL CALCULATED.3IONS-SCNC: 8 MMOL/L (ref 5–18)
BUN SERPL-MCNC: 13 MG/DL (ref 8–22)
CALCIUM SERPL-MCNC: 9.6 MG/DL (ref 8.5–10.5)
CHLORIDE BLD-SCNC: 100 MMOL/L (ref 98–107)
CO2 SERPL-SCNC: 36 MMOL/L (ref 22–31)
CREAT SERPL-MCNC: 0.52 MG/DL (ref 0.7–1.3)
GFR SERPL CREATININE-BSD FRML MDRD: >60 ML/MIN/1.73M2
GLUCOSE BLD-MCNC: 84 MG/DL (ref 70–125)
IRON SATN MFR SERPL: 10 % (ref 20–50)
IRON SERPL-MCNC: 26 UG/DL (ref 42–175)
POTASSIUM BLD-SCNC: 4.6 MMOL/L (ref 3.5–5)
SODIUM SERPL-SCNC: 144 MMOL/L (ref 136–145)
TIBC SERPL-MCNC: 251 UG/DL (ref 313–563)
TRANSFERRIN SERPL-MCNC: 201 MG/DL (ref 212–360)
TSH SERPL DL<=0.005 MIU/L-ACNC: 0.24 UIU/ML (ref 0.3–5)
VIT B12 SERPL-MCNC: 608 PG/ML (ref 213–816)

## 2020-01-28 ENCOUNTER — RECORDS - HEALTHEAST (OUTPATIENT)
Dept: LAB | Facility: CLINIC | Age: 50
End: 2020-01-28

## 2020-01-29 LAB
T4 FREE SERPL-MCNC: 1 NG/DL (ref 0.7–1.8)
TSH SERPL DL<=0.005 MIU/L-ACNC: 0.26 UIU/ML (ref 0.3–5)

## 2020-02-07 ENCOUNTER — RECORDS - HEALTHEAST (OUTPATIENT)
Dept: LAB | Facility: CLINIC | Age: 50
End: 2020-02-07

## 2020-02-07 LAB — T3FREE SERPL-MCNC: 3.3 PG/ML (ref 1.9–3.9)

## 2020-02-24 ENCOUNTER — RECORDS - HEALTHEAST (OUTPATIENT)
Dept: LAB | Facility: CLINIC | Age: 50
End: 2020-02-24

## 2020-02-24 LAB
ALBUMIN UR-MCNC: ABNORMAL MG/DL
APPEARANCE UR: CLEAR
BACTERIA #/AREA URNS HPF: ABNORMAL HPF
BILIRUB UR QL STRIP: NEGATIVE
COLOR UR AUTO: YELLOW
GLUCOSE UR STRIP-MCNC: NEGATIVE MG/DL
HGB UR QL STRIP: NEGATIVE
KETONES UR STRIP-MCNC: NEGATIVE MG/DL
LEUKOCYTE ESTERASE UR QL STRIP: NEGATIVE
MUCOUS THREADS #/AREA URNS LPF: ABNORMAL LPF
NITRATE UR QL: NEGATIVE
PH UR STRIP: 8 [PH] (ref 4.5–8)
RBC #/AREA URNS AUTO: ABNORMAL HPF
SP GR UR STRIP: 1.02 (ref 1–1.03)
SQUAMOUS #/AREA URNS AUTO: ABNORMAL LPF
UROBILINOGEN UR STRIP-ACNC: ABNORMAL
WBC #/AREA URNS AUTO: ABNORMAL HPF

## 2020-02-25 LAB — BACTERIA SPEC CULT: NO GROWTH

## 2020-03-03 ENCOUNTER — RECORDS - HEALTHEAST (OUTPATIENT)
Dept: LAB | Facility: CLINIC | Age: 50
End: 2020-03-03

## 2020-03-03 LAB — HGB BLD-MCNC: 10.6 G/DL (ref 14–18)

## 2020-03-26 ENCOUNTER — RECORDS - HEALTHEAST (OUTPATIENT)
Dept: LAB | Facility: CLINIC | Age: 50
End: 2020-03-26

## 2020-03-27 LAB
T4 TOTAL - HISTORICAL: 7.4 UG/DL (ref 4.5–13)
TSH SERPL DL<=0.005 MIU/L-ACNC: 0.47 UIU/ML (ref 0.3–5)

## 2020-03-28 LAB — TSH RECEP AB SER-ACNC: <0.9 IU/L

## 2020-03-31 LAB — THYROID PEROXIDASE ANTIBODIES - HISTORICAL: <3 IU/ML (ref 0–5.6)

## 2020-04-01 ENCOUNTER — RECORDS - HEALTHEAST (OUTPATIENT)
Dept: LAB | Facility: CLINIC | Age: 50
End: 2020-04-01

## 2020-04-01 LAB
BASOPHILS # BLD AUTO: 0.1 THOU/UL (ref 0–0.2)
BASOPHILS NFR BLD AUTO: 1 % (ref 0–2)
EOSINOPHIL # BLD AUTO: 0.3 THOU/UL (ref 0–0.4)
EOSINOPHIL NFR BLD AUTO: 3 % (ref 0–6)
ERYTHROCYTE [DISTWIDTH] IN BLOOD BY AUTOMATED COUNT: 14.4 % (ref 11–14.5)
HCT VFR BLD AUTO: 40.9 % (ref 40–54)
HGB BLD-MCNC: 11.6 G/DL (ref 14–18)
LYMPHOCYTES # BLD AUTO: 1.8 THOU/UL (ref 0.8–4.4)
LYMPHOCYTES NFR BLD AUTO: 20 % (ref 20–40)
MCH RBC QN AUTO: 30.4 PG (ref 27–34)
MCHC RBC AUTO-ENTMCNC: 28.4 G/DL (ref 32–36)
MCV RBC AUTO: 107 FL (ref 80–100)
MONOCYTES # BLD AUTO: 0.6 THOU/UL (ref 0–0.9)
MONOCYTES NFR BLD AUTO: 7 % (ref 2–10)
NEUTROPHILS # BLD AUTO: 6.6 THOU/UL (ref 2–7.7)
NEUTROPHILS NFR BLD AUTO: 70 % (ref 50–70)
PLATELET # BLD AUTO: 208 THOU/UL (ref 140–440)
PMV BLD AUTO: 12 FL (ref 8.5–12.5)
RBC # BLD AUTO: 3.82 MILL/UL (ref 4.4–6.2)
WBC: 9.4 THOU/UL (ref 4–11)